# Patient Record
Sex: FEMALE | Race: WHITE | NOT HISPANIC OR LATINO | Employment: UNEMPLOYED
[De-identification: names, ages, dates, MRNs, and addresses within clinical notes are randomized per-mention and may not be internally consistent; named-entity substitution may affect disease eponyms.]

---

## 2018-12-10 ENCOUNTER — TRANSCRIBE ORDERS (OUTPATIENT)
Dept: SCHEDULING | Age: 43
End: 2018-12-10

## 2018-12-10 DIAGNOSIS — N83.209 CYST OF OVARY: Primary | ICD-10-CM

## 2018-12-31 ENCOUNTER — OFFICE VISIT (OUTPATIENT)
Dept: FAMILY MEDICINE | Facility: CLINIC | Age: 43
End: 2018-12-31
Payer: COMMERCIAL

## 2018-12-31 ENCOUNTER — APPOINTMENT (OUTPATIENT)
Dept: LAB | Age: 43
End: 2018-12-31
Attending: FAMILY MEDICINE
Payer: COMMERCIAL

## 2018-12-31 VITALS
SYSTOLIC BLOOD PRESSURE: 120 MMHG | DIASTOLIC BLOOD PRESSURE: 82 MMHG | WEIGHT: 125.2 LBS | RESPIRATION RATE: 16 BRPM | TEMPERATURE: 97.6 F | OXYGEN SATURATION: 98 % | HEART RATE: 108 BPM

## 2018-12-31 DIAGNOSIS — Z00.00 ROUTINE PHYSICAL EXAMINATION: ICD-10-CM

## 2018-12-31 DIAGNOSIS — N83.202 CYST OF LEFT OVARY: ICD-10-CM

## 2018-12-31 DIAGNOSIS — R53.83 OTHER FATIGUE: ICD-10-CM

## 2018-12-31 DIAGNOSIS — E04.1 THYROID NODULE: ICD-10-CM

## 2018-12-31 DIAGNOSIS — R53.83 OTHER FATIGUE: Primary | ICD-10-CM

## 2018-12-31 PROBLEM — L11.1 GROVER'S DISEASE: Status: ACTIVE | Noted: 2018-12-31

## 2018-12-31 LAB
25(OH)D3 SERPL-MCNC: 45 NG/ML (ref 30–100)
ALBUMIN SERPL-MCNC: 4.3 G/DL (ref 3.4–5)
ALP SERPL-CCNC: 42 IU/L (ref 35–126)
ALT SERPL-CCNC: 12 IU/L (ref 11–54)
ANION GAP SERPL CALC-SCNC: 9 MEQ/L (ref 3–15)
AST SERPL-CCNC: 18 IU/L (ref 15–41)
BILIRUB SERPL-MCNC: 0.4 MG/DL (ref 0.3–1.2)
BUN SERPL-MCNC: 7 MG/DL (ref 8–20)
CALCIUM SERPL-MCNC: 9.5 MG/DL (ref 8.9–10.3)
CHLORIDE SERPL-SCNC: 106 MEQ/L (ref 98–109)
CHOLEST SERPL-MCNC: 245 MG/DL
CO2 SERPL-SCNC: 24 MEQ/L (ref 22–32)
CREAT SERPL-MCNC: 0.7 MG/DL
ERYTHROCYTE [DISTWIDTH] IN BLOOD BY AUTOMATED COUNT: 13.9 % (ref 11.7–14.4)
FERRITIN SERPL-MCNC: 6 NG/ML (ref 11–250)
GFR SERPL CREATININE-BSD FRML MDRD: >60 ML/MIN/1.73M*2
GLUCOSE SERPL-MCNC: 92 MG/DL (ref 70–99)
HCT VFR BLDCO AUTO: 41.7 %
HDLC SERPL-MCNC: 71 MG/DL
HDLC SERPL: 3.5 {RATIO}
HGB BLD-MCNC: 13.5 G/DL
IRON SATN MFR SERPL: 19 % (ref 15–45)
IRON SERPL-MCNC: 103 UG/DL (ref 35–150)
LDLC SERPL CALC-MCNC: 158 MG/DL
MCH RBC QN AUTO: 27.2 PG (ref 28–33.2)
MCHC RBC AUTO-ENTMCNC: 32.4 G/DL (ref 32.2–35.5)
MCV RBC AUTO: 84.1 FL (ref 83–98)
NONHDLC SERPL-MCNC: 174 MG/DL
PDW BLD AUTO: 11 FL (ref 9.4–12.3)
PLATELET # BLD AUTO: 324 K/UL
POTASSIUM SERPL-SCNC: 4.3 MEQ/L (ref 3.6–5.1)
PROT SERPL-MCNC: 6.9 G/DL (ref 6–8.2)
RBC # BLD AUTO: 4.96 M/UL (ref 3.93–5.22)
SODIUM SERPL-SCNC: 139 MEQ/L (ref 136–144)
T4 FREE SERPL-MCNC: 0.88 NG/DL (ref 0.58–1.64)
THYROPEROXIDASE AB SERPL-ACNC: 0.7 IU/ML
TIBC SERPL-MCNC: 538 UG/DL (ref 270–460)
TRIGL SERPL-MCNC: 82 MG/DL (ref 30–149)
TSH SERPL DL<=0.05 MIU/L-ACNC: 1.91 MIU/L (ref 0.34–5.6)
UIBC SERPL-MCNC: 435 UG/DL (ref 180–360)
VIT B12 SERPL-MCNC: 468 PG/ML (ref 180–914)
WBC # BLD AUTO: 5.62 K/UL

## 2018-12-31 PROCEDURE — 80053 COMPREHEN METABOLIC PANEL: CPT

## 2018-12-31 PROCEDURE — 82728 ASSAY OF FERRITIN: CPT

## 2018-12-31 PROCEDURE — 86376 MICROSOMAL ANTIBODY EACH: CPT

## 2018-12-31 PROCEDURE — 83550 IRON BINDING TEST: CPT

## 2018-12-31 PROCEDURE — 99213 OFFICE O/P EST LOW 20 MIN: CPT | Performed by: FAMILY MEDICINE

## 2018-12-31 PROCEDURE — 36415 COLL VENOUS BLD VENIPUNCTURE: CPT

## 2018-12-31 PROCEDURE — 85027 COMPLETE CBC AUTOMATED: CPT

## 2018-12-31 PROCEDURE — 84443 ASSAY THYROID STIM HORMONE: CPT

## 2018-12-31 PROCEDURE — 80061 LIPID PANEL: CPT

## 2018-12-31 PROCEDURE — 82607 VITAMIN B-12: CPT

## 2018-12-31 PROCEDURE — 84439 ASSAY OF FREE THYROXINE: CPT

## 2018-12-31 PROCEDURE — 82306 VITAMIN D 25 HYDROXY: CPT

## 2018-12-31 ASSESSMENT — ENCOUNTER SYMPTOMS
CHEST TIGHTNESS: 0
DIZZINESS: 0
MYALGIAS: 0
ARTHRALGIAS: 0
FREQUENCY: 0
ABDOMINAL PAIN: 0
CONSTIPATION: 0
COUGH: 0
BRUISES/BLEEDS EASILY: 0
SINUS PAIN: 0
SINUS PRESSURE: 0
RHINORRHEA: 0
HEMATURIA: 0
DIARRHEA: 0
SORE THROAT: 0
NERVOUS/ANXIOUS: 1
EYE PAIN: 0
PALPITATIONS: 0
WHEEZING: 0
SLEEP DISTURBANCE: 0
FATIGUE: 1
HEADACHES: 0
SHORTNESS OF BREATH: 0
CHILLS: 0
WEAKNESS: 0
FEVER: 0

## 2018-12-31 NOTE — PROGRESS NOTES
Subjective      Patient ID: Loraine Guevara is a 43 y.o. female.    She is here for hospital follow up. She went to [place] [facility] for lower abdominal pain and found to have L complete cyst 1.2cm x 1cm complex cyst and saw ob/gyn and put her on OCPs and didn't tolerate them so stopped it b/c of bleeding/mood changes and breast pain after taking it for 3wks and then got an awful period and noticing more fatigue. She is feeling alittle better but her mood is better. She is sleeping well. Also having dry eyes and dry skin and given refresh eye drops. She would like to have some labs done. She has gained some weight as well. She is due for repeat pelvic US in few wks as well.         The following have been reviewed and updated as appropriate in this visit:  Tobacco  Allergies  Meds  Problems  Med Hx  Surg Hx  Fam Hx       Review of Systems   Constitutional: Positive for fatigue. Negative for chills and fever.        Weight gain   HENT: Negative for ear pain, postnasal drip, rhinorrhea, sinus pain, sinus pressure and sore throat.    Eyes: Negative for pain and visual disturbance.   Respiratory: Negative for cough, chest tightness, shortness of breath and wheezing.    Cardiovascular: Negative for chest pain and palpitations.   Gastrointestinal: Negative for abdominal pain, constipation and diarrhea.   Genitourinary: Negative for decreased urine volume, frequency and hematuria.   Musculoskeletal: Negative for arthralgias and myalgias.   Skin: Negative for rash.        Dry skin/dry eyes   Neurological: Negative for dizziness, weakness and headaches.   Hematological: Does not bruise/bleed easily.   Psychiatric/Behavioral: Negative for behavioral problems, sleep disturbance and suicidal ideas. The patient is nervous/anxious.        No current outpatient prescriptions on file.     No current facility-administered medications for this visit.      History reviewed. No pertinent past medical history.  Family History    Problem Relation Age of Onset   • Thyroid disease Mother    • Anemia Father    • Thyroid disease Sister      Past Surgical History:   Procedure Laterality Date   •  SECTION     • KIDNEY STONE SURGERY       Social History     Social History   • Marital status:      Spouse name: N/A   • Number of children: N/A   • Years of education: N/A     Occupational History   • Not on file.     Social History Main Topics   • Smoking status: Not on file   • Smokeless tobacco: Not on file   • Alcohol use Not on file   • Drug use: Unknown   • Sexual activity: Not on file     Other Topics Concern   • Not on file     Social History Narrative   • No narrative on file     Allergies   Allergen Reactions   • Ciprofloxacin GI intolerance   • Sulfa (Sulfonamide Antibiotics) Rash       Objective   /82 (BP Location: Right upper arm, Patient Position: Sitting)   Pulse (!) 108   Temp 36.4 °C (97.6 °F) (Oral)   Resp 16   Wt 56.8 kg (125 lb 3.2 oz)   SpO2 98%   Physical Exam   Constitutional: She is oriented to person, place, and time. She appears well-developed and well-nourished.   HENT:   Head: Normocephalic and atraumatic.   Right Ear: External ear normal.   Left Ear: External ear normal.   Eyes: Conjunctivae are normal. Pupils are equal, round, and reactive to light.   Neck: Normal range of motion. Neck supple. No thyromegaly present.   Cardiovascular: Normal rate, regular rhythm and normal heart sounds.    No murmur heard.  Pulmonary/Chest: Effort normal and breath sounds normal. She has no wheezes.   Abdominal: Soft. Bowel sounds are normal. There is no tenderness.   Musculoskeletal: Normal range of motion. She exhibits no tenderness.   Lymphadenopathy:     She has no cervical adenopathy.   Neurological: She is alert and oriented to person, place, and time. No cranial nerve deficit.   Skin: Skin is warm and dry. No rash noted.   Psychiatric: She has a normal mood and affect.   Nursing note and vitals  reviewed.      Assessment/Plan   Problem List Items Addressed This Visit     None      Visit Diagnoses     Other fatigue    -  Primary    Doing alittle better but will check updated labs per patient request.     Relevant Orders    CBC    Iron and TIBC    Ferritin    Vitamin B12    Vitamin D 25 hydroxy    Routine physical examination        Overdue for fasting labs.     Relevant Orders    Comprehensive metabolic panel    Lipid panel    Thyroid nodule        Due for updated thyroid US (doing them yearly).    Relevant Orders    TSH 3rd Generation    T4, free    Thyroid peroxidase antibody    ULTRASOUND THYROID    Cyst of left ovary        Pt feeling better from it so will stay off the pill for now and check updated pelvic US.          Damaris Kwan, DO  12/31/2018

## 2019-01-02 ENCOUNTER — TELEPHONE (OUTPATIENT)
Dept: FAMILY MEDICINE | Facility: CLINIC | Age: 44
End: 2019-01-02

## 2019-01-02 DIAGNOSIS — R79.0 LOW SERUM FERRITIN LEVEL: ICD-10-CM

## 2019-01-02 DIAGNOSIS — E78.5 HYPERLIPIDEMIA, UNSPECIFIED HYPERLIPIDEMIA TYPE: Primary | ICD-10-CM

## 2019-01-02 RX ORDER — FERROUS SULFATE 325(65) MG
325 TABLET, DELAYED RELEASE (ENTERIC COATED) ORAL
Qty: 90 TABLET | Refills: 0
Start: 2019-01-02 | End: 2019-01-09 | Stop reason: ENTERED-IN-ERROR

## 2019-01-07 ENCOUNTER — TELEPHONE (OUTPATIENT)
Dept: FAMILY MEDICINE | Facility: CLINIC | Age: 44
End: 2019-01-07

## 2019-01-07 NOTE — TELEPHONE ENCOUNTER
Pt lvm states she had no problems with 1 iron tablet daily but since she has started 2 a day she is having leg pain and cramping. Asking if it is ok to just take 1 a day

## 2019-01-09 ENCOUNTER — APPOINTMENT (OUTPATIENT)
Dept: LAB | Age: 44
End: 2019-01-09
Attending: FAMILY MEDICINE
Payer: COMMERCIAL

## 2019-01-09 ENCOUNTER — OFFICE VISIT (OUTPATIENT)
Dept: FAMILY MEDICINE | Facility: CLINIC | Age: 44
End: 2019-01-09
Payer: COMMERCIAL

## 2019-01-09 VITALS
RESPIRATION RATE: 16 BRPM | HEART RATE: 90 BPM | DIASTOLIC BLOOD PRESSURE: 85 MMHG | TEMPERATURE: 97.6 F | WEIGHT: 125.6 LBS | OXYGEN SATURATION: 98 % | SYSTOLIC BLOOD PRESSURE: 120 MMHG

## 2019-01-09 DIAGNOSIS — F41.9 ANXIETY: ICD-10-CM

## 2019-01-09 DIAGNOSIS — R79.0 LOW FERRITIN: ICD-10-CM

## 2019-01-09 DIAGNOSIS — R79.0 LOW FERRITIN: Primary | ICD-10-CM

## 2019-01-09 LAB
ERYTHROCYTE [DISTWIDTH] IN BLOOD BY AUTOMATED COUNT: 14.6 % (ref 11.7–14.4)
FERRITIN SERPL-MCNC: 17 NG/ML (ref 11–250)
HCT VFR BLDCO AUTO: 42 %
HGB BLD-MCNC: 13.2 G/DL
IRON SATN MFR SERPL: 22 % (ref 15–45)
IRON SERPL-MCNC: 101 UG/DL (ref 35–150)
MCH RBC QN AUTO: 27.6 PG (ref 28–33.2)
MCHC RBC AUTO-ENTMCNC: 31.4 G/DL (ref 32.2–35.5)
MCV RBC AUTO: 87.7 FL (ref 83–98)
PDW BLD AUTO: 11.7 FL (ref 9.4–12.3)
PLATELET # BLD AUTO: 278 K/UL
RBC # BLD AUTO: 4.79 M/UL (ref 3.93–5.22)
TIBC SERPL-MCNC: 456 UG/DL (ref 270–460)
UIBC SERPL-MCNC: 355 UG/DL (ref 180–360)
WBC # BLD AUTO: 6.96 K/UL

## 2019-01-09 PROCEDURE — 82728 ASSAY OF FERRITIN: CPT

## 2019-01-09 PROCEDURE — 99213 OFFICE O/P EST LOW 20 MIN: CPT | Performed by: FAMILY MEDICINE

## 2019-01-09 PROCEDURE — 36415 COLL VENOUS BLD VENIPUNCTURE: CPT

## 2019-01-09 PROCEDURE — 85027 COMPLETE CBC AUTOMATED: CPT

## 2019-01-09 PROCEDURE — 83540 ASSAY OF IRON: CPT

## 2019-01-09 ASSESSMENT — ENCOUNTER SYMPTOMS
CONSTIPATION: 0
WHEEZING: 0
COUGH: 0
NERVOUS/ANXIOUS: 1
SHORTNESS OF BREATH: 0
ARTHRALGIAS: 0
DIARRHEA: 0
FATIGUE: 0
FREQUENCY: 0
SORE THROAT: 0
HEADACHES: 0
SLEEP DISTURBANCE: 0
ABDOMINAL PAIN: 0
HEMATURIA: 0
EYE PAIN: 0
SINUS PAIN: 0
CHEST TIGHTNESS: 0
CHILLS: 0
DIZZINESS: 0
MYALGIAS: 0
PALPITATIONS: 0
RHINORRHEA: 0
SINUS PRESSURE: 0
BRUISES/BLEEDS EASILY: 0
FEVER: 0
WEAKNESS: 0
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
## 2019-01-11 ENCOUNTER — TELEPHONE (OUTPATIENT)
Dept: FAMILY MEDICINE | Facility: CLINIC | Age: 44
End: 2019-01-11

## 2019-01-11 NOTE — TELEPHONE ENCOUNTER
dario she is aware of lab results ontinue iron in her diet and multivitamin as well and recheck in 4 months or so          ----- Message from Damaris Kwan, DO sent at 1/10/2019  3:49 PM EST -----  Please let Shelby know her iron and ferritin are looking better went from 6 to 17 so this is good and please continue iron in her diet and multivitamin as well and recheck in 4 months or so

## 2019-01-14 ENCOUNTER — HOSPITAL ENCOUNTER (OUTPATIENT)
Dept: RADIOLOGY | Age: 44
Discharge: HOME | End: 2019-01-14
Attending: FAMILY MEDICINE
Payer: COMMERCIAL

## 2019-01-14 ENCOUNTER — HOSPITAL ENCOUNTER (OUTPATIENT)
Dept: RADIOLOGY | Age: 44
Discharge: HOME | End: 2019-01-14
Attending: OBSTETRICS & GYNECOLOGY
Payer: COMMERCIAL

## 2019-01-14 ENCOUNTER — TRANSCRIBE ORDERS (OUTPATIENT)
Dept: RADIOLOGY | Age: 44
End: 2019-01-14

## 2019-01-14 DIAGNOSIS — E04.1 THYROID NODULE: ICD-10-CM

## 2019-01-14 DIAGNOSIS — N83.209 CYST OF OVARY: ICD-10-CM

## 2019-01-14 PROCEDURE — 76536 US EXAM OF HEAD AND NECK: CPT

## 2019-01-14 PROCEDURE — 76856 US EXAM PELVIC COMPLETE: CPT

## 2019-01-25 ENCOUNTER — OFFICE VISIT (OUTPATIENT)
Dept: FAMILY MEDICINE | Facility: CLINIC | Age: 44
End: 2019-01-25
Payer: COMMERCIAL

## 2019-01-25 VITALS
RESPIRATION RATE: 16 BRPM | SYSTOLIC BLOOD PRESSURE: 120 MMHG | WEIGHT: 127 LBS | TEMPERATURE: 97.6 F | HEART RATE: 94 BPM | DIASTOLIC BLOOD PRESSURE: 70 MMHG | OXYGEN SATURATION: 98 %

## 2019-01-25 DIAGNOSIS — F32.81 PMDD (PREMENSTRUAL DYSPHORIC DISORDER): Primary | ICD-10-CM

## 2019-01-25 PROCEDURE — 99214 OFFICE O/P EST MOD 30 MIN: CPT | Performed by: FAMILY MEDICINE

## 2019-01-25 RX ORDER — PAROXETINE 10 MG/1
10 TABLET, FILM COATED ORAL DAILY
Qty: 30 TABLET | Refills: 2 | Status: SHIPPED | OUTPATIENT
Start: 2019-01-25 | End: 2020-01-25

## 2019-01-25 ASSESSMENT — ENCOUNTER SYMPTOMS
RHINORRHEA: 0
DIZZINESS: 0
EYE PAIN: 0
NERVOUS/ANXIOUS: 0
MYALGIAS: 0
FREQUENCY: 0
CHILLS: 0
CHEST TIGHTNESS: 0
ARTHRALGIAS: 0
HEMATURIA: 0
SORE THROAT: 0
ABDOMINAL PAIN: 0
BRUISES/BLEEDS EASILY: 0
SINUS PRESSURE: 0
COUGH: 0
HEADACHES: 0
SINUS PAIN: 0
DIARRHEA: 0
WHEEZING: 0
FATIGUE: 0
PALPITATIONS: 0
SHORTNESS OF BREATH: 0
FEVER: 0
CONSTIPATION: 0
WEAKNESS: 0

## 2019-01-25 NOTE — PROGRESS NOTES
Subjective      Patient ID: Loraine Guevara is a 43 y.o. female.    She is here for PMDD. She has had 2 months now of severe depression symptoms 1wk prior and lasting until 2nd day of period and now she is feeling fine. She is going to be talking to her ob/gyn about this today. She was on Paxil in the past and did well with it. Denies SI/HI.        The following have been reviewed and updated as appropriate in this visit:  Allergies  Meds  Problems       Review of Systems   Constitutional: Negative for chills, fatigue and fever.   HENT: Negative for ear pain, postnasal drip, rhinorrhea, sinus pain, sinus pressure and sore throat.    Eyes: Negative for pain and visual disturbance.   Respiratory: Negative for cough, chest tightness, shortness of breath and wheezing.    Cardiovascular: Negative for chest pain and palpitations.   Gastrointestinal: Negative for abdominal pain, constipation and diarrhea.   Genitourinary: Negative for decreased urine volume, frequency and hematuria.   Musculoskeletal: Negative for arthralgias and myalgias.   Skin: Negative for rash.   Neurological: Negative for dizziness, weakness and headaches.   Hematological: Does not bruise/bleed easily.   Psychiatric/Behavioral: Negative for behavioral problems. The patient is not nervous/anxious.        Current Outpatient Prescriptions   Medication Sig Dispense Refill   • multivitamin-Ca-iron-minerals tablet Take 1 tablet by mouth.     • PARoxetine (PAXIL) 10 mg tablet Take 1 tablet (10 mg total) by mouth daily. 30 tablet 2     No current facility-administered medications for this visit.      No past medical history on file.  Family History   Problem Relation Age of Onset   • Thyroid disease Mother    • Anemia Father    • Hypertension Father    • Thyroid disease Sister      Past Surgical History:   Procedure Laterality Date   •  SECTION     • KIDNEY STONE SURGERY       Social History     Social History   • Marital status:       Spouse name: N/A   • Number of children: N/A   • Years of education: N/A     Occupational History   • Not on file.     Social History Main Topics   • Smoking status: Never Smoker   • Smokeless tobacco: Never Used   • Alcohol use Not on file   • Drug use: Unknown   • Sexual activity: Not on file     Other Topics Concern   • Not on file     Social History Narrative   • No narrative on file     Allergies   Allergen Reactions   • Ciprofloxacin GI intolerance   • Sulfacetamide    • Sulfa (Sulfonamide Antibiotics) Rash       Objective   /70 (BP Location: Right upper arm, Patient Position: Sitting)   Pulse 94   Temp 36.4 °C (97.6 °F) (Oral)   Resp 16   Wt 57.6 kg (127 lb)   LMP 01/24/2019 (Exact Date)   SpO2 98%   Physical Exam   Constitutional: She is oriented to person, place, and time. She appears well-developed and well-nourished.   HENT:   Head: Normocephalic and atraumatic.   Right Ear: External ear normal.   Left Ear: External ear normal.   Eyes: Conjunctivae are normal. Pupils are equal, round, and reactive to light.   Neck: Normal range of motion. Neck supple. No thyromegaly present.   Cardiovascular: Normal rate, regular rhythm and normal heart sounds.    No murmur heard.  Pulmonary/Chest: Effort normal and breath sounds normal. She has no wheezes.   Abdominal: Soft. Bowel sounds are normal. There is no tenderness.   Musculoskeletal: Normal range of motion. She exhibits no tenderness.   Lymphadenopathy:     She has no cervical adenopathy.   Neurological: She is alert and oriented to person, place, and time. No cranial nerve deficit.   Skin: Skin is warm and dry. No rash noted.   Psychiatric: She has a normal mood and affect.   Nursing note and vitals reviewed.      Assessment/Plan   Problem List Items Addressed This Visit     None      Visit Diagnoses     PMDD (premenstrual dysphoric disorder)    -  Primary    Relevant Medications    PARoxetine (PAXIL) 10 mg tablet        We discussed options and  she will try paxil starting on day 14 and stop once period comes and see how she does but she will also talk to her ob/gyn as well and recheck with me in 3 months.   Damaris Kwan, DO  1/25/2019

## 2021-01-28 ENCOUNTER — TRANSCRIBE ORDERS (OUTPATIENT)
Dept: SCHEDULING | Age: 46
End: 2021-01-28

## 2021-01-28 DIAGNOSIS — E04.2 NONTOXIC MULTINODULAR GOITER: Primary | ICD-10-CM

## 2021-02-04 ENCOUNTER — HOSPITAL ENCOUNTER (OUTPATIENT)
Dept: RADIOLOGY | Age: 46
Discharge: HOME | End: 2021-02-04
Attending: FAMILY MEDICINE
Payer: COMMERCIAL

## 2021-02-04 DIAGNOSIS — E04.2 NONTOXIC MULTINODULAR GOITER: ICD-10-CM

## 2021-02-04 PROCEDURE — 76536 US EXAM OF HEAD AND NECK: CPT

## 2021-04-14 DIAGNOSIS — Z23 ENCOUNTER FOR IMMUNIZATION: ICD-10-CM
